# Patient Record
Sex: FEMALE | Race: WHITE | NOT HISPANIC OR LATINO | URBAN - METROPOLITAN AREA
[De-identification: names, ages, dates, MRNs, and addresses within clinical notes are randomized per-mention and may not be internally consistent; named-entity substitution may affect disease eponyms.]

---

## 2019-05-22 ENCOUNTER — APPOINTMENT (OUTPATIENT)
Age: 24
Setting detail: DERMATOLOGY
End: 2019-05-23

## 2019-05-22 VITALS — WEIGHT: 120 LBS | HEIGHT: 64 IN

## 2019-05-22 DIAGNOSIS — L20.89 OTHER ATOPIC DERMATITIS: ICD-10-CM

## 2019-05-22 PROBLEM — F41.9 ANXIETY DISORDER, UNSPECIFIED: Status: ACTIVE | Noted: 2019-05-22

## 2019-05-22 PROBLEM — L20.84 INTRINSIC (ALLERGIC) ECZEMA: Status: ACTIVE | Noted: 2019-05-22

## 2019-05-22 PROCEDURE — OTHER MIPS QUALITY: OTHER

## 2019-05-22 PROCEDURE — OTHER TREATMENT REGIMEN: OTHER

## 2019-05-22 PROCEDURE — OTHER COUNSELING: OTHER

## 2019-05-22 PROCEDURE — 99202 OFFICE O/P NEW SF 15 MIN: CPT

## 2019-05-22 PROCEDURE — OTHER FOLLOW UP FOR NEXT VISIT: OTHER

## 2019-05-22 ASSESSMENT — BSA RASH: BSA RASH: 0

## 2019-05-22 ASSESSMENT — LOCATION DETAILED DESCRIPTION DERM
LOCATION DETAILED: RIGHT LATERAL ABDOMEN
LOCATION DETAILED: LEFT VENTRAL PROXIMAL FOREARM

## 2019-05-22 ASSESSMENT — LOCATION ZONE DERM
LOCATION ZONE: ARM
LOCATION ZONE: TRUNK

## 2019-05-22 ASSESSMENT — LOCATION SIMPLE DESCRIPTION DERM
LOCATION SIMPLE: LEFT FOREARM
LOCATION SIMPLE: ABDOMEN

## 2019-05-22 ASSESSMENT — SEVERITY ASSESSMENT: SEVERITY: MILD

## 2019-05-22 NOTE — PROCEDURE: TREATMENT REGIMEN
Plan: She has two small patches of what appears to be a nummular or eczematous dermatitis. Hopefully the top of the steroid will lead to total resolution. If not patient will call for further treatment recommendations. This could represent a fungal infection, we might consider biopsy if no improvement
Samples Given: Bryhali x2
Detail Level: Zone

## 2019-05-22 NOTE — HPI: BUMPS
How Severe Are Your Bumps?: mild
Have Your Bumps Been Treated?: not been treated
Is This A New Presentation, Or A Follow-Up?: Bump
Additional History: Pt states she had a similar lesion on her left arm but it is going away. She has used OTC hydrocortisone with little to no help

## 2019-05-22 NOTE — PROCEDURE: FOLLOW UP FOR NEXT VISIT
Scheduled For Follow Up In (Optional): 2-4 weeks
Detail Level: Simple
Instructions (Optional): If symptoms worsen or do not improve

## 2024-05-02 NOTE — PROCEDURE: MIPS QUALITY
Detail Level: Detailed
Quality 110: Preventive Care And Screening: Influenza Immunization: Influenza Immunization not Administered because Patient Refused.
[Follow-Up] : a follow-up visit
[Hypersomnolence] : hypersomnolence
[Sleep Apnea] : sleep apnea
[TextBox_44] : s/p H. pylori infection Rx